# Patient Record
(demographics unavailable — no encounter records)

---

## 2024-08-15 NOTE — NUR
1722 RETURNS TO ROOM 8 PER CART.  AWAKE, ALERT.  RESP UNLABORED.  HOB ELEVATED
75 DEGREES.  ABD SOFT.  DENIES PAIN OR URINARY URGENCY.  VITAL SIGNS OBTAINED.
 WIFE IN ROOM.
1735 TOLERATES PO JUICE AND MUFFIN WITHOUT NAUSEA
1740 DISCHARGE INSTRUCTIONS REVIEWED.  PATIENT VERBALIZES UNDERSTANDING.  COPY
PROVIDED IN DISCHARGE FOLDER
1755 AMBULATES TO BATHROOM WITH STANDBY ASSIST.  ADMITS TO URINATING WITHOUT
DIFFICULTY.  LIGHT PINK URINE.  DRESSES SELF